# Patient Record
Sex: FEMALE | Race: WHITE
[De-identification: names, ages, dates, MRNs, and addresses within clinical notes are randomized per-mention and may not be internally consistent; named-entity substitution may affect disease eponyms.]

---

## 2023-04-29 ENCOUNTER — HOSPITAL ENCOUNTER (EMERGENCY)
Dept: HOSPITAL 95 - ER | Age: 88
Discharge: HOME | End: 2023-04-29
Payer: COMMERCIAL

## 2023-04-29 VITALS — DIASTOLIC BLOOD PRESSURE: 97 MMHG | SYSTOLIC BLOOD PRESSURE: 147 MMHG

## 2023-04-29 VITALS — BODY MASS INDEX: 21.99 KG/M2 | HEIGHT: 60 IN | WEIGHT: 111.99 LBS
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
## 2023-04-29 DIAGNOSIS — R53.1: ICD-10-CM

## 2023-04-29 DIAGNOSIS — Z91.013: ICD-10-CM

## 2023-04-29 DIAGNOSIS — Z88.1: ICD-10-CM

## 2023-04-29 DIAGNOSIS — S70.02XA: Primary | ICD-10-CM

## 2023-04-29 DIAGNOSIS — S90.02XA: ICD-10-CM

## 2023-04-29 PROCEDURE — A9270 NON-COVERED ITEM OR SERVICE: HCPCS

## 2023-05-06 ENCOUNTER — HOSPITAL ENCOUNTER (INPATIENT)
Dept: HOSPITAL 95 - ER | Age: 88
LOS: 4 days | Discharge: ANOTHER HOSPITAL | DRG: 536 | End: 2023-05-10
Attending: HOSPITALIST | Admitting: HOSPITALIST
Payer: MEDICARE

## 2023-05-06 VITALS — HEIGHT: 61 IN | BODY MASS INDEX: 21.14 KG/M2 | WEIGHT: 111.99 LBS

## 2023-05-06 VITALS — DIASTOLIC BLOOD PRESSURE: 57 MMHG | SYSTOLIC BLOOD PRESSURE: 140 MMHG

## 2023-05-06 DIAGNOSIS — Z88.1: ICD-10-CM
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
## 2023-05-06 DIAGNOSIS — Z79.891: ICD-10-CM

## 2023-05-06 DIAGNOSIS — I10: ICD-10-CM

## 2023-05-06 DIAGNOSIS — S72.012A: Primary | ICD-10-CM

## 2023-05-06 DIAGNOSIS — Z91.013: ICD-10-CM

## 2023-05-06 DIAGNOSIS — M19.90: ICD-10-CM

## 2023-05-06 DIAGNOSIS — Z66: ICD-10-CM

## 2023-05-06 DIAGNOSIS — I05.0: ICD-10-CM

## 2023-05-06 DIAGNOSIS — Z98.890: ICD-10-CM

## 2023-05-06 LAB
ANION GAP SERPL CALCULATED.4IONS-SCNC: 3 MMOL/L (ref 6–16)
BASOPHILS # BLD AUTO: 0.03 K/MM3 (ref 0–0.23)
BASOPHILS NFR BLD AUTO: 1 % (ref 0–2)
BUN SERPL-MCNC: 12 MG/DL (ref 8–24)
CALCIUM SERPL-MCNC: 9.3 MG/DL (ref 8.5–10.1)
CHLORIDE SERPL-SCNC: 108 MMOL/L (ref 98–108)
CO2 SERPL-SCNC: 29 MMOL/L (ref 21–32)
CREAT SERPL-MCNC: 0.49 MG/DL (ref 0.4–1)
DEPRECATED RDW RBC AUTO: 42.4 FL (ref 35.1–46.3)
EOSINOPHIL # BLD AUTO: 0.08 K/MM3 (ref 0–0.68)
EOSINOPHIL NFR BLD AUTO: 1 % (ref 0–6)
ERYTHROCYTE [DISTWIDTH] IN BLOOD BY AUTOMATED COUNT: 11.7 % (ref 11.7–14.2)
GLUCOSE SERPL-MCNC: 103 MG/DL (ref 70–99)
HCT VFR BLD AUTO: 41.7 % (ref 33–51)
HGB BLD-MCNC: 14 G/DL (ref 11.5–16)
IMM GRANULOCYTES # BLD AUTO: 0.01 K/MM3 (ref 0–0.1)
IMM GRANULOCYTES NFR BLD AUTO: 0 % (ref 0–1)
LYMPHOCYTES # BLD AUTO: 0.82 K/MM3 (ref 0.84–5.2)
LYMPHOCYTES NFR BLD AUTO: 14 % (ref 21–46)
MCHC RBC AUTO-ENTMCNC: 33.6 G/DL (ref 31.5–36.5)
MCV RBC AUTO: 97 FL (ref 80–100)
MONOCYTES # BLD AUTO: 0.53 K/MM3 (ref 0.16–1.47)
MONOCYTES NFR BLD AUTO: 9 % (ref 4–13)
NEUTROPHILS # BLD AUTO: 4.4 K/MM3 (ref 1.96–9.15)
NEUTROPHILS NFR BLD AUTO: 75 % (ref 41–73)
NRBC # BLD AUTO: 0 K/MM3 (ref 0–0.02)
NRBC BLD AUTO-RTO: 0 /100 WBC (ref 0–0.2)
PLATELET # BLD AUTO: 202 K/MM3 (ref 150–400)
POTASSIUM SERPL-SCNC: 4 MMOL/L (ref 3.5–5.5)
PROTHROMBIN TIME: 10 SEC (ref 9.7–11.5)
SODIUM SERPL-SCNC: 140 MMOL/L (ref 136–145)

## 2023-05-06 PROCEDURE — A9270 NON-COVERED ITEM OR SERVICE: HCPCS

## 2023-05-06 NOTE — NUR
Hx of HTN.    Will continue home medications.   SHIFT SUMMARY:
 
ASSUMED CARE OF PATIENT UPON HER ARRIVAL FROM ER AT 1825. TRANSFERRED FROM
[facility] TO BED WITH MAX ASSIST. PAIN WELL CONTROLLED AT THIS TIME. ORTHO
CONSULT CALLED IN TO DR. RALPH'S OFFICE. STRICT BEDREST, PUREWICK IN PLACE
FOR URINE. SKIN INTACT, SLIGHT BRUISING ON L TROCHANTER.  IS AWARE OF NPO
STATUS AFTER MN.

## 2023-05-07 VITALS — DIASTOLIC BLOOD PRESSURE: 69 MMHG | SYSTOLIC BLOOD PRESSURE: 150 MMHG

## 2023-05-07 VITALS — SYSTOLIC BLOOD PRESSURE: 135 MMHG | DIASTOLIC BLOOD PRESSURE: 69 MMHG

## 2023-05-07 VITALS — DIASTOLIC BLOOD PRESSURE: 64 MMHG | SYSTOLIC BLOOD PRESSURE: 155 MMHG

## 2023-05-07 VITALS — DIASTOLIC BLOOD PRESSURE: 66 MMHG | SYSTOLIC BLOOD PRESSURE: 136 MMHG

## 2023-05-07 VITALS — DIASTOLIC BLOOD PRESSURE: 49 MMHG | SYSTOLIC BLOOD PRESSURE: 110 MMHG

## 2023-05-07 NOTE — NUR
SHIFT SUMMARY:
PATIENT A&OX4. CALM, PLEASANT AND COOPERATIVE c CARE USES CALL LIGHT
APPROPRIATELY AND ABLE TO MAKE NEEDS KNOWN. PATIENT REPORTS PAIN 5-8/10 TO
LEFT HIP. MEDICATED X2 c 25 MG OF IV FENTANYL AND 15 MG OF IV TORADOL THIS
SHIFT. PATIENT REPORTS GOOD RELIEF. DR. OTERO (ORTHO SURGEON) CAME IN FOR
CONSULT THIS AM AND SPOKE c PATIENT AND FAMILY c THE PLAN OF CARE. PER DR. RALPH TO HAVE PATIENT NPO AT MN FOR POSSIBLE SURGERY TO L HIP TOMORROW.
CONTINENCE OF URINE AND STOOL AND USES BEDPAN T/O SHIFT. VITAL SIGNS REVIEWED.
CALL LIGHT IN REACH.

## 2023-05-07 NOTE — NUR
SHIFT SUMMARY
87 YR F ADMITTED ON 5/6/23 FOR LEFT FEMUR/HIP FX. DNR. PT HAS HAD FAMILY AT
BEDSIDE FOR MOST OF THIS SHIFT AND SHE APPEARS TO BE IN GOOD SPIRITS. SHE C/O
PAIN @ 7 BUT TOLERATES IT WELL. PAIN MEDS GIVEN PER EMAR. ARIEL IN PLACE
AND WORKING WELL. SHE IS SCHEDULED FOR SURGERY THIS A.M. AND HAS BEEN NPO
SINCE MIDNIGHT.

## 2023-05-07 NOTE — NUR
ASSUMED CARE OF PT @ 0500. PT DX IS L FEMUR FX. PT IS NPO FOR POSSIBLE
SURGERY. PT HAS ORTHO CONSULT SCHEDULED FOR TODAY. PT HAS PUREWICK IN PLACE.PT
IS CURRENTLY RESTING WITH BED ALARM ON, BED IN LOWEST POSITION, AND CALL LIGHT
WIHTIN REACH.

## 2023-05-08 VITALS — SYSTOLIC BLOOD PRESSURE: 149 MMHG | DIASTOLIC BLOOD PRESSURE: 64 MMHG

## 2023-05-08 VITALS — DIASTOLIC BLOOD PRESSURE: 64 MMHG | SYSTOLIC BLOOD PRESSURE: 155 MMHG

## 2023-05-08 VITALS — DIASTOLIC BLOOD PRESSURE: 64 MMHG | SYSTOLIC BLOOD PRESSURE: 125 MMHG

## 2023-05-08 VITALS — SYSTOLIC BLOOD PRESSURE: 159 MMHG | DIASTOLIC BLOOD PRESSURE: 54 MMHG

## 2023-05-08 LAB
ALBUMIN SERPL BCP-MCNC: 2.6 G/DL (ref 3.4–5)
ANION GAP SERPL CALCULATED.4IONS-SCNC: 1 MMOL/L (ref 6–16)
BASOPHILS # BLD AUTO: 0.04 K/MM3 (ref 0–0.23)
BASOPHILS NFR BLD AUTO: 1 % (ref 0–2)
BUN SERPL-MCNC: 20 MG/DL (ref 8–24)
CALCIUM SERPL-MCNC: 8.8 MG/DL (ref 8.5–10.1)
CHLORIDE SERPL-SCNC: 109 MMOL/L (ref 98–108)
CO2 SERPL-SCNC: 29 MMOL/L (ref 21–32)
CREAT SERPL-MCNC: 0.51 MG/DL (ref 0.4–1)
DEPRECATED RDW RBC AUTO: 42.2 FL (ref 35.1–46.3)
EOSINOPHIL # BLD AUTO: 0.18 K/MM3 (ref 0–0.68)
EOSINOPHIL NFR BLD AUTO: 3 % (ref 0–6)
ERYTHROCYTE [DISTWIDTH] IN BLOOD BY AUTOMATED COUNT: 11.8 % (ref 11.7–14.2)
GLUCOSE SERPL-MCNC: 107 MG/DL (ref 70–99)
HCT VFR BLD AUTO: 35.3 % (ref 33–51)
HGB BLD-MCNC: 11.7 G/DL (ref 11.5–16)
IMM GRANULOCYTES # BLD AUTO: 0.01 K/MM3 (ref 0–0.1)
IMM GRANULOCYTES NFR BLD AUTO: 0 % (ref 0–1)
LYMPHOCYTES # BLD AUTO: 1.31 K/MM3 (ref 0.84–5.2)
LYMPHOCYTES NFR BLD AUTO: 24 % (ref 21–46)
MCHC RBC AUTO-ENTMCNC: 33.1 G/DL (ref 31.5–36.5)
MCV RBC AUTO: 96 FL (ref 80–100)
MONOCYTES # BLD AUTO: 0.53 K/MM3 (ref 0.16–1.47)
MONOCYTES NFR BLD AUTO: 10 % (ref 4–13)
NEUTROPHILS # BLD AUTO: 3.3 K/MM3 (ref 1.96–9.15)
NEUTROPHILS NFR BLD AUTO: 61 % (ref 41–73)
NRBC # BLD AUTO: 0 K/MM3 (ref 0–0.02)
NRBC BLD AUTO-RTO: 0 /100 WBC (ref 0–0.2)
PHOSPHATE SERPL-MCNC: 3.1 MG/DL (ref 2.5–4.9)
PLATELET # BLD AUTO: 210 K/MM3 (ref 150–400)
POTASSIUM SERPL-SCNC: 4.1 MMOL/L (ref 3.5–5.5)
SODIUM SERPL-SCNC: 139 MMOL/L (ref 136–145)

## 2023-05-08 NOTE — NUR
SHIFT SUMMARY
PT A&OX4, VSS/RA, COLT PO, VOIDING/PUREWICK TO  MLS TODAY, BEDREST, PAIN
MANAGED PER EMAR, DAUGHTER AT BEDSIDE. PLAN FOR NPO 0005/O.R. TOMORROW. WILL
REPORT TO ONCOMING NOC RN.

## 2023-05-08 NOTE — NUR
pt has been transfered to surgical floor via bed, report given to Jacklyn MILLER,
all belongings were taken to 220 with pt. family in attendence.

## 2023-05-08 NOTE — NUR
pt laying in bed with family at bedside, a/ox3, pleasant and cooperative with
care, follows commands well, denies pain at this time, asked her to let us
know if has pain, lungs are clear t/o, resp even and unlabored, no cough
noted, hrr, loud murmur noted, no edema noted, ppp faint, cap refill <3 sec,
vs stable, afebrile, piv sites are clear and patent, btx4, abd flat soft
nontender, purwick in place to avoid moving pt, as that causes pain, skin
c/w/d, umesh art, call light in reach. plan is for surg repair today.

## 2023-05-08 NOTE — NUR
SHIFT SUMMARY NOC
 
PT A/O X 4. FORGETFUL AND CONFUSED AT TIMES. PT HAD C/O OF PAIN IN L HIP AND
WAS MEDICATED PER EMAR. PT HAS L HIP ARTHUR ARTHROPLASTY SURGERY SCHEDULED FOR
AFTERNOON TODAY FOR PROTHESIS PLACEMENT. DAUGHTER STAYED NIGHT IN ROOM. PT 2
DAUGHTERS ARE HEALTHCARE POA FOR PT AND WANT TO BE INFORMED OF ANY ADDITIONAL
DECESIONS THAT NEED TO BE MADE ON BEHALF OF PT (POA LETTER AND CONTACT INFO ON
FRONT OF PT CHART IN BOX). PT HAS BEEN NPO SINCE MIDNIGHT WITH LR INFUSING @
100 MLS/HR IN PREPARATION FOR SURGICAL PROCEDURE.  PUREWICK STILL IN PLACE
DRAINING YELLOW URINE TO SUCTION. PT IS CURRENTLY RESTING WITH BED IN LOWEST
POSITION, AND CALL LIGHT WITHIN REACH.

## 2023-05-09 VITALS — DIASTOLIC BLOOD PRESSURE: 56 MMHG | SYSTOLIC BLOOD PRESSURE: 152 MMHG

## 2023-05-09 VITALS — DIASTOLIC BLOOD PRESSURE: 71 MMHG | SYSTOLIC BLOOD PRESSURE: 147 MMHG

## 2023-05-09 VITALS — SYSTOLIC BLOOD PRESSURE: 150 MMHG | DIASTOLIC BLOOD PRESSURE: 61 MMHG

## 2023-05-09 VITALS — DIASTOLIC BLOOD PRESSURE: 79 MMHG | SYSTOLIC BLOOD PRESSURE: 176 MMHG

## 2023-05-09 NOTE — NUR
SHIFT SUMMARY
PT IS HERE W/ A LEFT SIDED HIP FX. SHE HAS BEEN NPO SINCE 0000 AND SURGICAL
INFECTION PREVENTION WAS DONE AT ABOUT 0030. DAUGHTER HAS BEEN AT THE BEDSIDE
ALL NIGHT AND HAS HELPED THE PT CALL FOR ANY NEEDS. SHE HAS NOT COMPLAINED OF
ANY PAIN AND HAS NOT BEEN GIVEN ANY PRN MEDICATIONS. PT HAS VOIDED THIS SHIFT
USING OUR PURWICK, AND HER URINE IS YELLOW W/O ANY ODORS. PURWICK HAS BEEN
CHANGED THIS SHIFT. SHE HAS BEEN BEDREST AND Q2 TURN. SHE IS A&OX4, AND HAS
DENIED ANY N/V/D. HER BED IS IN LOW, CALL LIGHT IN REACH, AND BED ALARM IS ON.
SEE NOTES FOR ANY UPDATES.

## 2023-05-09 NOTE — NUR
THE PATIENT'S PROCEDURE WAS CANCELLED FOR A CARDIAC CONSULT. THE ORDERS WERE
PLACED AND THE PATIENT WAS RETURNED TO HER ROOM.

## 2023-05-10 VITALS — SYSTOLIC BLOOD PRESSURE: 159 MMHG | DIASTOLIC BLOOD PRESSURE: 69 MMHG

## 2023-05-10 VITALS — DIASTOLIC BLOOD PRESSURE: 79 MMHG | SYSTOLIC BLOOD PRESSURE: 191 MMHG

## 2023-05-10 VITALS — DIASTOLIC BLOOD PRESSURE: 72 MMHG | SYSTOLIC BLOOD PRESSURE: 165 MMHG

## 2023-05-10 VITALS — SYSTOLIC BLOOD PRESSURE: 179 MMHG | DIASTOLIC BLOOD PRESSURE: 74 MMHG

## 2023-05-10 NOTE — NUR
TRANSFER PROGRESS
SPOKE WITH DR HOLDEN. [facility] AND [facility] HAVE PLACED PATIENT ON WAIT
LISTS. FACE SHEETS AND IMAGES HAVE BEEN FAXED AND ELECTRONICALLY PUSHED
TO BOTH
FACILITIES. PATIENT AND DAUGHTER UPDATED.

## 2023-05-10 NOTE — NUR
TRANSFER
CALL PLACED TO [facility] AND REPORT GIVEN TO NURSE GROVES, WHO IS TAKING THIS
PATIENT. REPORT COMPLETED, RECIEVING NURSE HAD NO FURTHER QUESTION.
TRANSPORT ARRIVED AND REPORTS GIVEN. PATIENT AND FAMILY EDUATED ON TRANSFER
AND PLAN OF CARE. PT MEDICATED WITH NIGHT TIME MEDS AND TORADOL FOR PAIN
CONTROL. PT PLACED INTO CLEAN BRIEF FOR TRANSFER AND PUT ONTO STRETCHER. PT
LEFT THE FLOOR IN NO DISTRESS, PAIN MANAGED, AND WITH NO FURTHER QUESTIONS. PT
LEFT THE FLOOR AT 2110.

## 2023-05-10 NOTE — NUR
SHIFT SUMMARY
PT REMAINS AWAITING TRANSFER TO DIFFERENT FACILITY FOR SURGERY. SHE REPORTS
PAIN TOLERABLE AND DENIES NEED FOR MEDICATION. DAUGHTER AT BEDSIDE HELPING
WITH CARE, PT CALLS TO MAKE HER NEEDS MET. TOLERATING DIET WELL, PUREWICK IN
PLACE FOR VOIDING.

## 2023-05-10 NOTE — NUR
SHIFT SUMMARY
VSS, PT NOTED TO HAVE HTN. PT REMAINS ASYMPTOMATIC. PT APPEARS MORE CONFUSED
THIS AM, UNABLE TO RECALL WHERE SHE IS OR WHY SHE IS IN THE HOSPITAL. SHE IS
ABLE TO IDENTIFY HERSELF AND HER DAUGHTER, BUT NOTHING ELSE. PT IS PULLING AT
LINES AND UNDRESSING HERSELF, PT IS UNABLE TO UNDERSTAND WHY SHE DID THIS. PT
HAS BEEN VOIDING W/O DIFFICULTY, PURE WICK REMOVED D/T PT PULLING IT OUT. NPO
THIS AM AT 0500 AS A PRECAUTION FOR POSSIBLE SURGERY, THOUGH IT IS UNLIKELY.
DAUGHTER HAS REMAINED AT BEDSIDE T/O THE NIGHT. PT SLEPT ON AND OFF. MEDICATED
FOR PAIN ONCE WITH TORADOL AND TYLENOL. PT HAS BEEN TOLLERATED PO INTAKE W/O
N/V, HAVING BM'S, PASSING FLATTUS, AND PARTICIPATING IN CARE. NO ACUTE EVENTS
T/O THE NIGHT. PLAN FOR DR HOLLINS TO ROUND THIS AM AND DISCUSS PLAN OF CARE.
THE PATIENT IS CURRENTLY RESTING, IN NO DISTRESS, CALL LIGHT IN REACH